# Patient Record
Sex: FEMALE | Race: ASIAN | NOT HISPANIC OR LATINO
[De-identification: names, ages, dates, MRNs, and addresses within clinical notes are randomized per-mention and may not be internally consistent; named-entity substitution may affect disease eponyms.]

---

## 2024-08-28 ENCOUNTER — RESULT REVIEW (OUTPATIENT)
Age: 24
End: 2024-08-28

## 2024-08-29 ENCOUNTER — RESULT REVIEW (OUTPATIENT)
Age: 24
End: 2024-08-29

## 2024-08-29 ENCOUNTER — TRANSCRIPTION ENCOUNTER (OUTPATIENT)
Age: 24
End: 2024-08-29

## 2024-09-12 ENCOUNTER — APPOINTMENT (OUTPATIENT)
Dept: CARDIOLOGY | Facility: CLINIC | Age: 24
End: 2024-09-12
Payer: MEDICAID

## 2024-09-12 ENCOUNTER — NON-APPOINTMENT (OUTPATIENT)
Age: 24
End: 2024-09-12

## 2024-09-12 VITALS
WEIGHT: 110 LBS | HEART RATE: 90 BPM | RESPIRATION RATE: 14 BRPM | TEMPERATURE: 97.8 F | DIASTOLIC BLOOD PRESSURE: 88 MMHG | HEIGHT: 66 IN | BODY MASS INDEX: 17.68 KG/M2 | OXYGEN SATURATION: 100 % | SYSTOLIC BLOOD PRESSURE: 127 MMHG

## 2024-09-12 NOTE — HISTORY OF PRESENT ILLNESS
[FreeTextEntry1] : Melva is a 23-year-old female s/p hospitalized for UTI and r/o endocarditis. Sent home on antibiotics. TOld to f/u with cardiology and pulmonary. Inital ECHO RV dysfunction. Feels fine now.

## 2024-09-12 NOTE — DISCUSSION/SUMMARY
[FreeTextEntry1] : The patient is a 23-year-old female smoker s/p hospitalization for UTI who was r/o for endocarditis. #1 CV- normal ECG and no symptoms, TTE and GEOVANNA reviewed and no evidence RV dysfunction or vegetation #2 Pulm- no evidence pHTN, complete review with pulmonary next week #3 General- complete smoking cessation [EKG obtained to assist in diagnosis and management of assessed problem(s)] : EKG obtained to assist in diagnosis and management of assessed problem(s)

## 2024-09-13 ENCOUNTER — APPOINTMENT (OUTPATIENT)
Dept: PULMONOLOGY | Facility: CLINIC | Age: 24
End: 2024-09-13

## 2024-09-18 ENCOUNTER — APPOINTMENT (OUTPATIENT)
Dept: PULMONOLOGY | Facility: CLINIC | Age: 24
End: 2024-09-18
Payer: MEDICAID

## 2024-09-18 VITALS
RESPIRATION RATE: 15 BRPM | OXYGEN SATURATION: 100 % | SYSTOLIC BLOOD PRESSURE: 119 MMHG | HEART RATE: 74 BPM | WEIGHT: 110 LBS | DIASTOLIC BLOOD PRESSURE: 83 MMHG | BODY MASS INDEX: 17.68 KG/M2 | TEMPERATURE: 98 F | HEIGHT: 66 IN

## 2024-09-18 DIAGNOSIS — R93.1 ABNORMAL FINDINGS ON DIAGNOSTIC IMAGING OF HEART AND CORONARY CIRCULATION: ICD-10-CM

## 2024-09-18 DIAGNOSIS — R06.09 OTHER FORMS OF DYSPNEA: ICD-10-CM

## 2024-09-18 NOTE — PHYSICAL EXAM
[No Acute Distress] : no acute distress [Normal Oropharynx] : normal oropharynx [Normal Appearance] : normal appearance [No Neck Mass] : no neck mass [Normal Rate/Rhythm] : normal rate/rhythm [Normal S1, S2] : normal s1, s2 [No Murmurs] : no murmurs [No Resp Distress] : no resp distress [Clear to Auscultation Bilaterally] : clear to auscultation bilaterally [No Abnormalities] : no abnormalities [Benign] : benign [Normal Gait] : normal gait [No Clubbing] : no clubbing [No Cyanosis] : no cyanosis [No Edema] : no edema [FROM] : FROM [No Focal Deficits] : no focal deficits [Oriented x3] : oriented x3 [Normal Affect] : normal affect [TextBox_125] : body piercings and tatoos

## 2024-09-18 NOTE — ASSESSMENT
[FreeTextEntry1] : 22 yo F current smoker with possible past medical history of asthma recent hospitalization for UTI and was ruled out for endocarditis. sent to pulmonary for concerns of PHTN.  #recommendations patient with initial TTE with concerns for TR and PHTN however GEOVANNA w/o signficant TR or RV dysfunction no clinical symptoms of RV failure/PHTN at this time with regards to BANUELOS has alternative explanation without alarm symptoms, lower part of chest on imaging normal without eos on blood work encouraged smoking cessation if BANUELOS persists, will obtain PFTs, dedicated CT chest and labwork however will hold off for now

## 2024-09-18 NOTE — HISTORY OF PRESENT ILLNESS
[Current] : current [TextBox_4] : 24 yo F current smoker with possible past medical history of asthma recent hospitalization for UTI and was ruled out for endocarditis. sent to pulmonary for concerns of PHTN.  had inhaler in remote past however never formerly diagnosed with asthma. does not have frequent asthma exacerbations, wheezing, cough, mucus production  intermittently will have BANUELOS, however may be deconditioned and attributes her BANUELOS to her anxiety  no leg swelling, good appetite, no dysphagia, joint swelling, rash,   some fruit allergies, works as a   no personal or family history of AI dz or cancers  vapes e cigs, marijuana